# Patient Record
Sex: MALE | Race: WHITE | NOT HISPANIC OR LATINO | Employment: FULL TIME | URBAN - METROPOLITAN AREA
[De-identification: names, ages, dates, MRNs, and addresses within clinical notes are randomized per-mention and may not be internally consistent; named-entity substitution may affect disease eponyms.]

---

## 2017-07-06 ENCOUNTER — HOSPITAL ENCOUNTER (OUTPATIENT)
Facility: MEDICAL CENTER | Age: 52
End: 2017-07-07
Attending: EMERGENCY MEDICINE | Admitting: SURGERY
Payer: OTHER MISCELLANEOUS

## 2017-07-06 ENCOUNTER — APPOINTMENT (OUTPATIENT)
Dept: RADIOLOGY | Facility: MEDICAL CENTER | Age: 52
End: 2017-07-06
Attending: EMERGENCY MEDICINE
Payer: OTHER MISCELLANEOUS

## 2017-07-06 DIAGNOSIS — S41.112A LACERATION OF ARM, LEFT, COMPLICATED, INITIAL ENCOUNTER: ICD-10-CM

## 2017-07-06 PROBLEM — S41.102A OPEN WOUND OF LEFT UPPER ARM: Status: ACTIVE | Noted: 2017-07-06

## 2017-07-06 PROBLEM — S46.902A: Status: ACTIVE | Noted: 2017-07-06

## 2017-07-06 PROBLEM — S41.102A: Status: ACTIVE | Noted: 2017-07-06

## 2017-07-06 LAB
ALBUMIN SERPL BCP-MCNC: 4.2 G/DL (ref 3.2–4.9)
ALBUMIN/GLOB SERPL: 1.2 G/DL
ALP SERPL-CCNC: 71 U/L (ref 30–99)
ALT SERPL-CCNC: 32 U/L (ref 2–50)
ANION GAP SERPL CALC-SCNC: 13 MMOL/L (ref 0–11.9)
AST SERPL-CCNC: 21 U/L (ref 12–45)
BILIRUB SERPL-MCNC: 0.5 MG/DL (ref 0.1–1.5)
BUN SERPL-MCNC: 16 MG/DL (ref 8–22)
CALCIUM SERPL-MCNC: 9.2 MG/DL (ref 8.5–10.5)
CHLORIDE SERPL-SCNC: 103 MMOL/L (ref 96–112)
CO2 SERPL-SCNC: 21 MMOL/L (ref 20–33)
CREAT SERPL-MCNC: 0.95 MG/DL (ref 0.5–1.4)
ERYTHROCYTE [DISTWIDTH] IN BLOOD BY AUTOMATED COUNT: 41.5 FL (ref 35.9–50)
ETHANOL BLD-MCNC: 0 G/DL
GFR SERPL CREATININE-BSD FRML MDRD: >60 ML/MIN/1.73 M 2
GLOBULIN SER CALC-MCNC: 3.5 G/DL (ref 1.9–3.5)
GLUCOSE BLD-MCNC: 165 MG/DL (ref 65–99)
GLUCOSE SERPL-MCNC: 159 MG/DL (ref 65–99)
HCT VFR BLD AUTO: 45.3 % (ref 42–52)
HGB BLD-MCNC: 15.3 G/DL (ref 14–18)
MCH RBC QN AUTO: 31.2 PG (ref 27–33)
MCHC RBC AUTO-ENTMCNC: 33.8 G/DL (ref 33.7–35.3)
MCV RBC AUTO: 92.3 FL (ref 81.4–97.8)
PLATELET # BLD AUTO: 283 K/UL (ref 164–446)
PMV BLD AUTO: 9.1 FL (ref 9–12.9)
POTASSIUM SERPL-SCNC: 3.6 MMOL/L (ref 3.6–5.5)
PROT SERPL-MCNC: 7.7 G/DL (ref 6–8.2)
RBC # BLD AUTO: 4.91 M/UL (ref 4.7–6.1)
SODIUM SERPL-SCNC: 137 MMOL/L (ref 135–145)
WBC # BLD AUTO: 14.7 K/UL (ref 4.8–10.8)

## 2017-07-06 PROCEDURE — A9270 NON-COVERED ITEM OR SERVICE: HCPCS | Performed by: SURGERY

## 2017-07-06 PROCEDURE — 501838 HCHG SUTURE GENERAL: Performed by: SURGERY

## 2017-07-06 PROCEDURE — 80053 COMPREHEN METABOLIC PANEL: CPT

## 2017-07-06 PROCEDURE — 700111 HCHG RX REV CODE 636 W/ 250 OVERRIDE (IP)

## 2017-07-06 PROCEDURE — 500698 HCHG HEMOCLIP, MEDIUM: Performed by: SURGERY

## 2017-07-06 PROCEDURE — A4450 NON-WATERPROOF TAPE: HCPCS | Performed by: SURGERY

## 2017-07-06 PROCEDURE — 160036 HCHG PACU - EA ADDL 30 MINS PHASE I: Performed by: SURGERY

## 2017-07-06 PROCEDURE — 700101 HCHG RX REV CODE 250

## 2017-07-06 PROCEDURE — 700111 HCHG RX REV CODE 636 W/ 250 OVERRIDE (IP): Performed by: NURSE PRACTITIONER

## 2017-07-06 PROCEDURE — 160035 HCHG PACU - 1ST 60 MINS PHASE I: Performed by: SURGERY

## 2017-07-06 PROCEDURE — 700102 HCHG RX REV CODE 250 W/ 637 OVERRIDE(OP): Performed by: SURGERY

## 2017-07-06 PROCEDURE — 160002 HCHG RECOVERY MINUTES (STAT): Performed by: SURGERY

## 2017-07-06 PROCEDURE — 700111 HCHG RX REV CODE 636 W/ 250 OVERRIDE (IP): Performed by: SURGERY

## 2017-07-06 PROCEDURE — A9270 NON-COVERED ITEM OR SERVICE: HCPCS

## 2017-07-06 PROCEDURE — 90471 IMMUNIZATION ADMIN: CPT

## 2017-07-06 PROCEDURE — 36415 COLL VENOUS BLD VENIPUNCTURE: CPT | Performed by: SURGERY

## 2017-07-06 PROCEDURE — 82962 GLUCOSE BLOOD TEST: CPT

## 2017-07-06 PROCEDURE — 500389 HCHG DRAIN, RESERVOIR SUCT JP 100CC: Performed by: SURGERY

## 2017-07-06 PROCEDURE — 80307 DRUG TEST PRSMV CHEM ANLYZR: CPT

## 2017-07-06 PROCEDURE — 96375 TX/PRO/DX INJ NEW DRUG ADDON: CPT

## 2017-07-06 PROCEDURE — 160028 HCHG SURGERY MINUTES - 1ST 30 MINS LEVEL 3: Performed by: SURGERY

## 2017-07-06 PROCEDURE — 73090 X-RAY EXAM OF FOREARM: CPT | Mod: LT

## 2017-07-06 PROCEDURE — G0378 HOSPITAL OBSERVATION PER HR: HCPCS

## 2017-07-06 PROCEDURE — 500440 HCHG DRESSING, STERILE ROLL (KERLIX): Performed by: SURGERY

## 2017-07-06 PROCEDURE — 96365 THER/PROPH/DIAG IV INF INIT: CPT

## 2017-07-06 PROCEDURE — 90715 TDAP VACCINE 7 YRS/> IM: CPT

## 2017-07-06 PROCEDURE — 502240 HCHG MISC OR SUPPLY RC 0272: Performed by: SURGERY

## 2017-07-06 PROCEDURE — G0390 TRAUMA RESPONS W/HOSP CRITI: HCPCS

## 2017-07-06 PROCEDURE — 700102 HCHG RX REV CODE 250 W/ 637 OVERRIDE(OP)

## 2017-07-06 PROCEDURE — 85027 COMPLETE CBC AUTOMATED: CPT

## 2017-07-06 PROCEDURE — 160009 HCHG ANES TIME/MIN: Performed by: SURGERY

## 2017-07-06 PROCEDURE — 160048 HCHG OR STATISTICAL LEVEL 1-5: Performed by: SURGERY

## 2017-07-06 PROCEDURE — 71010 DX-CHEST-LIMITED (1 VIEW): CPT

## 2017-07-06 PROCEDURE — 160039 HCHG SURGERY MINUTES - EA ADDL 1 MIN LEVEL 3: Performed by: SURGERY

## 2017-07-06 PROCEDURE — 99291 CRITICAL CARE FIRST HOUR: CPT

## 2017-07-06 RX ORDER — DOCUSATE SODIUM 100 MG/1
100 CAPSULE, LIQUID FILLED ORAL 2 TIMES DAILY
Status: DISCONTINUED | OUTPATIENT
Start: 2017-07-06 | End: 2017-07-07 | Stop reason: HOSPADM

## 2017-07-06 RX ORDER — POLYETHYLENE GLYCOL 3350 17 G/17G
1 POWDER, FOR SOLUTION ORAL 2 TIMES DAILY
Status: DISCONTINUED | OUTPATIENT
Start: 2017-07-06 | End: 2017-07-07 | Stop reason: HOSPADM

## 2017-07-06 RX ORDER — ENEMA 19; 7 G/133ML; G/133ML
1 ENEMA RECTAL
Status: DISCONTINUED | OUTPATIENT
Start: 2017-07-06 | End: 2017-07-07 | Stop reason: HOSPADM

## 2017-07-06 RX ORDER — CHLORHEXIDINE GLUCONATE ORAL RINSE 1.2 MG/ML
15 SOLUTION DENTAL EVERY 12 HOURS
Status: DISCONTINUED | OUTPATIENT
Start: 2017-07-06 | End: 2017-07-06

## 2017-07-06 RX ORDER — BISACODYL 10 MG
10 SUPPOSITORY, RECTAL RECTAL
Status: DISCONTINUED | OUTPATIENT
Start: 2017-07-06 | End: 2017-07-07 | Stop reason: HOSPADM

## 2017-07-06 RX ORDER — AMOXICILLIN 250 MG
1 CAPSULE ORAL NIGHTLY
Status: DISCONTINUED | OUTPATIENT
Start: 2017-07-06 | End: 2017-07-07 | Stop reason: HOSPADM

## 2017-07-06 RX ORDER — OXYCODONE HYDROCHLORIDE 5 MG/1
5 TABLET ORAL
Status: DISCONTINUED | OUTPATIENT
Start: 2017-07-06 | End: 2017-07-07 | Stop reason: HOSPADM

## 2017-07-06 RX ORDER — OXYCODONE HCL 5 MG/5 ML
SOLUTION, ORAL ORAL
Status: COMPLETED
Start: 2017-07-06 | End: 2017-07-06

## 2017-07-06 RX ORDER — ACETAMINOPHEN 500 MG
1000 TABLET ORAL EVERY 6 HOURS
Status: DISCONTINUED | OUTPATIENT
Start: 2017-07-06 | End: 2017-07-07 | Stop reason: HOSPADM

## 2017-07-06 RX ORDER — OXYCODONE HYDROCHLORIDE 10 MG/1
10 TABLET ORAL
Status: DISCONTINUED | OUTPATIENT
Start: 2017-07-06 | End: 2017-07-07 | Stop reason: HOSPADM

## 2017-07-06 RX ORDER — AMOXICILLIN 250 MG
1 CAPSULE ORAL
Status: DISCONTINUED | OUTPATIENT
Start: 2017-07-06 | End: 2017-07-07 | Stop reason: HOSPADM

## 2017-07-06 RX ORDER — ONDANSETRON 2 MG/ML
4 INJECTION INTRAMUSCULAR; INTRAVENOUS EVERY 4 HOURS PRN
Status: DISCONTINUED | OUTPATIENT
Start: 2017-07-06 | End: 2017-07-07 | Stop reason: HOSPADM

## 2017-07-06 RX ADMIN — FENTANYL CITRATE 25 MCG: 50 INJECTION, SOLUTION INTRAMUSCULAR; INTRAVENOUS at 17:30

## 2017-07-06 RX ADMIN — ACETAMINOPHEN 1000 MG: 500 TABLET ORAL at 23:42

## 2017-07-06 RX ADMIN — METFORMIN HYDROCHLORIDE 500 MG: 500 TABLET ORAL at 17:45

## 2017-07-06 RX ADMIN — CEFAZOLIN SODIUM 2 G: 1 INJECTION, SOLUTION INTRAVENOUS at 15:38

## 2017-07-06 RX ADMIN — CLOSTRIDIUM TETANI TOXOID ANTIGEN (FORMALDEHYDE INACTIVATED), CORYNEBACTERIUM DIPHTHERIAE TOXOID ANTIGEN (FORMALDEHYDE INACTIVATED), BORDETELLA PERTUSSIS TOXOID ANTIGEN (GLUTARALDEHYDE INACTIVATED), BORDETELLA PERTUSSIS FILAMENTOUS HEMAGGLUTININ ANTIGEN (FORMALDEHYDE INACTIVATED), BORDETELLA PERTUSSIS PERTACTIN ANTIGEN, AND BORDETELLA PERTUSSIS FIMBRIAE 2/3 ANTIGEN 0.5 ML: 5; 2; 2.5; 5; 3; 5 INJECTION, SUSPENSION INTRAMUSCULAR at 15:36

## 2017-07-06 RX ADMIN — FENTANYL CITRATE 25 MCG: 50 INJECTION, SOLUTION INTRAMUSCULAR; INTRAVENOUS at 18:10

## 2017-07-06 RX ADMIN — OXYCODONE HYDROCHLORIDE 10 MG: 5 SOLUTION ORAL at 17:50

## 2017-07-06 RX ADMIN — ONDANSETRON 4 MG: 2 INJECTION INTRAMUSCULAR; INTRAVENOUS at 21:59

## 2017-07-06 ASSESSMENT — LIFESTYLE VARIABLES
DO YOU DRINK ALCOHOL: NO
EVER_SMOKED: NEVER

## 2017-07-06 ASSESSMENT — PAIN SCALES - WONG BAKER
WONGBAKER_NUMERICALRESPONSE: HURTS A LITTLE MORE
WONGBAKER_NUMERICALRESPONSE: HURTS A LITTLE MORE

## 2017-07-06 NOTE — IP AVS SNAPSHOT
" <p align=\"LEFT\"><IMG SRC=\"//[email or web address]$/[email or web address]\" alt=\"Image\" WIDTH=\"50%\" HEIGHT=\"200\" BORDER=\"\"></p>                   Name:Jeff Perez  Medical Record Number:[id number]  CSN: [id number]    YOB: 1965   Age: 52 y.o.  Sex: male  HT:1.702 m (5' 7\") WT: 80.74 kg (178 lb)          Admit Date: 7/6/2017     Discharge Date:   Today's Date: 7/7/2017  Attending Doctor:  No att. providers found                  Allergies:  Review of patient's allergies indicates no known allergies.          Follow-up Information     1. Follow up with Abdullahi Sanchez M.D. In 1 week.    Specialty:  Surgery    Contact information    [address] [facility] [address]  [place] [id number] [phone]           Medication List      Take these Medications        Instructions    metformin 500 MG Tabs   Commonly known as:  GLUCOPHAGE    Take 500 mg by mouth every day.   Dose:  500 mg       oxycodone immediate-release 5 MG Tabs   Commonly known as:  ROXICODONE    Take 1 Tab by mouth every 6 hours as needed (Moderate Pain (NRS Pain Scale 4-6; CPOT Pain Scale 3-5)).   Dose:  5 mg         "

## 2017-07-06 NOTE — RESPIRATORY CARE
Respiratory Trauma Red Note    Pt arrived on RA, SpO2 96 BL BS noted, no further interventions needed at this time.

## 2017-07-06 NOTE — IP AVS SNAPSHOT
Make YES[employer] Happen Access Code: [email or web address]  Expires: 8/6/2017  1:35 PM    Make YES! Happen  A secure, online tool to manage your health information     [employer]’s Make YES! Happen® is a secure, online tool that connects you to your personalized health information from the privacy of your home -- day or night - making it very easy for you to manage your healthcare. Once the activation process is completed, you can even access your medical information using the Make YES! Happen samuel, which is available for free in the Apple Samuel store or Google Play store.     Make YES! Happen provides the following levels of access (as shown below):   My Chart Features   [facility] Primary Care Doctor [facility]  Specialists [facility]  Urgent  Care Non-[facility]  Primary Care  Doctor   Email your healthcare team securely and privately 24/7 X X X X   Manage appointments: schedule your next appointment; view details of past/upcoming appointments X      Request prescription refills. X      View recent personal medical records, including lab and immunizations X X X X   View health record, including health history, allergies, medications X X X X   Read reports about your outpatient visits, procedures, consult and ER notes X X X X   See your discharge summary, which is a recap of your hospital and/or ER visit that includes your diagnosis, lab results, and care plan. X X       How to register for Make YES! Happen:  1. Go to  [email or web address].  2. Click on the Sign Up Now box, which takes you to the New Member Sign Up page. You will need to provide the following information:  a. Enter your Make YES! Happen Access Code exactly as it appears at the top of this page. (You will not need to use this code after you’ve completed the sign-up process. If you do not sign up before the expiration date, you must request a new code.)   b. Enter your date of birth.   c. Enter your home email address.   d. Click Submit, and follow the next screen’s instructions.  3. Create a Make YES! Happen ID. This will be your Make YES! Happen  login ID and cannot be changed, so think of one that is secure and easy to remember.  4. Create a [employer] password. You can change your password at any time.  5. Enter your Password Reset Question and Answer. This can be used at a later time if you forget your password.   6. Enter your e-mail address. This allows you to receive e-mail notifications when new information is available in [employer].  7. Click Sign Up. You can now view your health information.    For assistance activating your [employer] account, call [phone]

## 2017-07-06 NOTE — IP AVS SNAPSHOT
7/7/2017    Jeff Perez  [address] [place] [address]    Dear Jeff:    [facility] wants to ensure your discharge home is safe and you or your loved ones have had all of your questions answered regarding your care after you leave the hospital.    Below is a list of resources and contact information should you have any questions regarding your hospital stay, follow-up instructions, or active medical symptoms.    Questions or Concerns Regarding… Contact   Medical Questions Related to Your Discharge  (7 days a week, 8am-5pm) Contact a Nurse Care Coordinator   [phone]   Medical Questions Not Related to Your Discharge  (24 hours a day / 7 days a week)  Contact the Nurse Health Line   [phone]    Medications or Discharge Instructions Refer to your discharge packet   or contact your [facility] Primary Care Provider   [phone]   Follow-up Appointment(s) Schedule your appointment via [employer]   or contact Scheduling [phone]   Billing Review your statement via [employer]  or contact Billing [phone]   Medical Records Review your records via [employer]   or contact Medical Records [phone]     You may receive a telephone call within two days of discharge. This call is to make certain you understand your discharge instructions and have the opportunity to have any questions answered. You can also easily access your medical information, test results and upcoming appointments via the [employer] free online health management tool. You can learn more and sign up at [employer]. For assistance setting up your [employer] account, please call [phone].    Once again, we want to ensure your discharge home is safe and that you have a clear understanding of any next steps in your care. If you have any questions or concerns, please do not hesitate to contact us, we are here for you. Thank you for choosing [facility] for your healthcare needs.    Sincerely,    Your [facility] Healthcare Team

## 2017-07-06 NOTE — H&P
"TRAUMA HISTORY AND PHYSICAL    CHIEF COMPLAINT: Laceration to left arm with questionable loss of pulses    HISTORY OF PRESENT ILLNESS: The patient is a 52-year-old man. The patient was triaged as a Trauma Red in accordance with established pre hospital protocols. An expeditious primary and secondary survey with required adjuncts was conducted. See Trauma Narrator for full details. By reports he had a mirror fall on him which resulted in a laceration to his left forearm. Active bleeding was noted at the scene and a tourniquet was placed. Prior to placing the tourniquet he was noted to have a grossly intact neurovascular exam.    PAST MEDICAL HISTORY:  has a past medical history of Diabetes (CMS-[facility]).     PAST SURGICAL HISTORY:  has no past surgical history on file.     ALLERGIES: NKMA.     CURRENT MEDICATIONS:  None  Home Medications     Reviewed by Perla Jang R.N. (Registered Nurse) on 07/06/17 at 1553  Med List Status: Unable to Obtain    Medication Last Dose Status          Patient Jamison Taking any Medications                        FAMILY HISTORY: family history is not on file.    SOCIAL HISTORY: No smoking    REVIEW OF SYSTEMS: Unable to be elicited secondary to the acuity of the patient's condition.    PHYSICAL EXAMINATION:     CONSTITUTIONAL:     Vital Signs: Blood pressure 121/77, pulse 68, temperature 36.1 °C (97 °F), resp. rate 15, height 1.702 m (5' 7\"), weight 80.74 kg (178 lb), SpO2 99 %.   General Appearance: appears stated age, is in no apparent distress, is well developed and well nourished.  HEENT:     The nares and oropharynx are clear. The midface and jaw are stable. No malocclusion is evident.  NECK:    . The trachea is midline. There is no jugulovenous distention or cervical crepitance.   RESPIRATORY:   Inspection: unlabored respirations, no intercostal retractions or accessory muscle use.   Palpation:  nontender. The clavicles are nondeformed.   Auscultation: " normal.  CARDIOVASCULAR:   Auscultation: normal.   Peripheral Pulses: Normal.   ABDOMEN: Abdomen is soft, nontender, without organomegaly or masses.  GENITOURINARY:   (MALE): normal male external genitalia.  MUSCULOSKELETAL:    The pelvis is stable.    inspection of back is normal.  Extremity: He has a laceration in his distal forearm and the posterior lateral area. There is active bleeding which appears to be venous. Of note, his neurovascular exam distal to this is intact.  SKIN:    Normal.  NEUROLOGIC:    GCS 15. no focal deficits noted, mental status intact.  PSYCHIATRIC:   does not appear depressed or anxious.    LABORATORY VALUES:   Recent Labs      07/06/17   1532   WBC  14.7*   RBC  4.91   HEMOGLOBIN  15.3   HEMATOCRIT  45.3   MCV  92.3   MCH  31.2   MCHC  33.8   RDW  41.5   PLATELETCT  283   MPV  9.1                    IMAGING:   DX-FOREARM LEFT   Final Result      No evidence of fracture or dislocation.      DX-CHEST-LIMITED (1 VIEW)   Final Result      No evidence of acute cardiopulmonary process.          IMPRESSION AND PLAN:     Active Hospital Problems    Diagnosis   • Open wound of left arm with tendon injury [S41.102A, S46.902A]     To OR for exploration 7/6      assessment: 52-year-old man generally in good health now with a laceration to his left distal forearm. He has grossly intact neuro and vascular function. He does have some ongoing active bleeding. Exploration in the OR for achieving hemostasis, washout, and closure is indicated.  Plan: to the OR for the above    DISPOSITION: OR.    CRITICAL CARE TIME: 32 minutes excluding procedures.  ____________________________________   Abdullahi Sanchez M.D.    DD: 7/6/2017  3:57 PM

## 2017-07-06 NOTE — ED PROVIDER NOTES
ED Provider Note    Scribed for Emir Holloway D.O. by Tawana Garza. 7/6/2017  3:29 PM    Primary care provider: No primary care provider on file.  Means of arrival: EMS  History obtained from: Patient and EMS  History limited by: None    CHIEF COMPLAINT  Chief Complaint   Patient presents with   • Trauma Red       HPI  Limit Seven is a 52-year-old male who presents to the Emergency Department as a trauma red for evaluation of a left forearm laceration onset prior to his arrival. Per EMS, the patient is a  and received a laceration when he was installing a mirror that broke and fell onto his arm. EMS reports reports that the patient had all pulses intact prior to Tourniquet placement. The patient denies any pain at this time. The patient's tetanus is up to date.    REVIEW OF SYSTEMS  Pertinent positives include wrist laceration. Pertinent negatives include no loss of sensation or strength in affected area. All other review systems are negative  C.    PAST MEDICAL HISTORY  Past Medical History   Diagnosis Date   • Diabetes (CMS-HCC)        SURGICAL HISTORY  No surgical history noted.    SOCIAL HISTORY  Patient works as a .    FAMILY HISTORY  No family history noted    CURRENT MEDICATIONS  No current facility-administered medications on file prior to encounter.     No current outpatient prescriptions on file prior to encounter.     ALLERGIES  No Known Allergies    PHYSICAL EXAM  VITAL SIGNS: /77 mmHg  Pulse 68  Resp 15  SpO2 99%  Nursing notes and vitals reviewed.  Constitutional: Well developed, Well nourished, Non-toxic appearance.   Eyes: PERRLA, EOMI, Conjunctiva normal, No discharge.   HENT: Symmetric, atraumatic, no scalp laceration, no facial bony deformity or tenderness, no hemotympanum, no dental trauma, normal oropharynx.    Neck: No cervical spine tenderness, no step off deformity, patient is in a c-spine immobilization collar.    Cardiovascular: Normal heart rate, Normal rhythm, No murmurs, No rubs, No gallops, Normal heart tones.   Thorax & Lungs: No respiratory distress, Breath sounds equal bilaterally with equal chest expansion upon inspiration, No subcutaneous air, No flail segment, No rales, No rhonchi, No wheezing, No chest tenderness.   Abdomen: Bowel sounds normal, Soft, No tenderness, No guarding, No rebound, No pulsatile masses.   Skin: Warm, 12 cm stellate laceration the left volar forearm, slight active bleeding, superficial.   Musculoskeletal: Intact distal pulses lacerations described as above, ulnar and radial pulses are brisk, no elbow tenderness  Extremities: No long bone tenderness or deformity, distal pulses are brisk, pelvis is stable.   Neurologic: Alert & oriented x 3, GCS 15, ulnar, median and radial nerve intact sensation strength left upper extremity.  Psychiatric: Affect normal for clinical presentation.    DIAGNOSTIC STUDIES/PROCEDURES    LABS  Results for orders placed or performed during the hospital encounter of 07/06/17   DIAGNOSTIC ALCOHOL   Result Value Ref Range    Diagnostic Alcohol 0.00 0.00 g/dL   CBC WITHOUT DIFFERENTIAL   Result Value Ref Range    WBC 14.7 (H) 4.8 - 10.8 K/uL    RBC 4.91 4.70 - 6.10 M/uL    Hemoglobin 15.3 14.0 - 18.0 g/dL    Hematocrit 45.3 42.0 - 52.0 %    MCV 92.3 81.4 - 97.8 fL    MCH 31.2 27.0 - 33.0 pg    MCHC 33.8 33.7 - 35.3 g/dL    RDW 41.5 35.9 - 50.0 fL    Platelet Count 283 164 - 446 K/uL    MPV 9.1 9.0 - 12.9 fL   COMP METABOLIC PANEL   Result Value Ref Range    Sodium 137 135 - 145 mmol/L    Potassium 3.6 3.6 - 5.5 mmol/L    Chloride 103 96 - 112 mmol/L    Co2 21 20 - 33 mmol/L    Anion Gap 13.0 (H) 0.0 - 11.9    Glucose 159 (H) 65 - 99 mg/dL    Bun 16 8 - 22 mg/dL    Creatinine 0.95 0.50 - 1.40 mg/dL    Calcium 9.2 8.5 - 10.5 mg/dL    AST(SGOT) 21 12 - 45 U/L    ALT(SGPT) 32 2 - 50 U/L    Alkaline Phosphatase 71 30 - 99 U/L    Total Bilirubin 0.5 0.1 - 1.5 mg/dL     Albumin 4.2 3.2 - 4.9 g/dL    Total Protein 7.7 6.0 - 8.2 g/dL    Globulin 3.5 1.9 - 3.5 g/dL    A-G Ratio 1.2 g/dL   ESTIMATED GFR   Result Value Ref Range    GFR If African American >60 >60 mL/min/1.73 m 2    GFR If Non African American >60 >60 mL/min/1.73 m 2   ACCU-CHEK GLUCOSE   Result Value Ref Range    Glucose - Accu-Ck 165 (H) 65 - 99 mg/dL     All labs reviewed by me.    RADIOLOGY  DX-FOREARM LEFT   Final Result      No evidence of fracture or dislocation.      DX-CHEST-LIMITED (1 VIEW)   Final Result      No evidence of acute cardiopulmonary process.        The radiologist's interpretation of all radiological studies have been reviewed by me.    COURSE & MEDICAL DECISION MAKING  Pertinent Labs & Imaging studies reviewed. (See chart for details)    3:29 PM - I was called acutely to examine the patient in the trauma bay. Patient will be treated with IVPB Ancef and 0.5 ml Adacel injection. Ordered left forearm x-ray, chest x-ray, Diagnostic alcohol, CBC without differential, CMP, estimated GFR to evaluate his symptoms.   This is a [facility] 52-year-old male presents with laceration to the left forearm. The patient is neurovascularly intact. The patient received Ancef 2 g IV, tetanus booster and will be going to the operating room with Dr. Sanchez. He has no focal neurological vascular deficits upon admission to surgery. 3:32 PM Dr. Sanchez, Trauma surgery, arrived in the trauma bay.    3:33 PM Ordered a chest and forearm x-ray.    3:36 PM The patient will be admitted to the OR.    DISPOSITION:  Patient will be admitted to OR in guarded condition.    FINAL IMPRESSION  1. Laceration of arm, left, complicated, initial encounter          Tawana WILLINGHAM (Ryan), am scribing for, and in the presence of, Emir Hollowya D.O    Electronically signed by: Tawana Garza (Ryan), 7/6/2017    Emir WILLINGHAM D.O. personally performed the services described in this documentation, as scribed  by Tawana Garza in my presence, and it is both accurate and complete.    The note accurately reflects work and decisions made by me.  Emir Holloway  7/6/2017  6:33 PM

## 2017-07-06 NOTE — IP AVS SNAPSHOT
" Home Care Instructions                                                                                                                  Name:Jeff Perez  Medical Record Number:[id number]  CSN: [id number]    YOB: 1965   Age: 52 y.o.  Sex: male  HT:1.702 m (5' 7\") WT: 80.74 kg (178 lb)          Admit Date: 7/6/2017     Discharge Date:   Today's Date: 7/7/2017  Attending Doctor:  No att. providers found                  Allergies:  Review of patient's allergies indicates no known allergies.            Discharge Instructions         MEWS - Patient has a score 2 or less   Jeff Perez MR#[id number] (CSN:[id number])  (52 y.o. M)  (Adm: 07/06/17 1528)     [id number]         Attending Provider: (none) Comment          Last edited by  on  at      Allergies: No Known Allergies    Isolation: None   MDRO: None   Code Status: FULL    HT: 1.702 m (5' 7\")   WT: 80.74 kg (178 lb)   Admission Wt: 80.74 kg (178 lb)    Free Text DX: Open wound of left upper arm   Coded DX: None    BMI: 27.87 kg/m 2   BSA: 1.95 m 2             Admission Medication Reconciliation Status      Reviewed by Codi Garcia, Pharmacy Int on 07/06/17 at 1608  Med List Status: Complete              Held Orders **Orders signed and held for discharge/readmit must only be released on the new encounter!  1     None           Clinician Collect      None       Reviewed Transfer Orders      None       Meds to be Acknowledged           None       Orders to be Acknowledged           New Nursing Orders  Acknowledge Section      Ordered       Ordering Provider        07/07/17 1249  DISCHARGE NURSING COMMUNICATION Start: 07/07/17 1249, CONTINUOUS, ROUTINE     Comments: - Call or seek medical attention for questions or concerns   - Follow up with Dr. Sanchez in one weeks time   - Follow up with primary care provider within one weeks time   - Resume regular diet   - Continue daily over the counter stool softener while on narcotics   - No " operation of machinery or motorized vehicles while under the influence of narcotics   - No alcohol use while under the influence of narcotics   - No swimming, hot tubs, baths or wound submersion until cleared by outpatient provider. May shower   - No contact sports, strenuous activities, or heavy lifting until cleared by outpatient provider   - If respiratory decompensation, swelling of your arm, worsening pain of your arm, fever, or signs or symptoms of infection occur seek medical attention   Additional Information Start Time Order ID Status   Transfer Service: --    Verbal Mode: --    Patient Class: --    Ordering User: KENTON Albert    Process Instructions: --     07/07/17 1249 [id number] Sent       KENTON Albert Acknowledge New     07/07/17 1231  REMOVE DRAINS/TUBES Start: 07/07/17 1232, End: 07/07/17 1232, ONCE, ROUTINE     Additional Information Start Time Order ID Status   Transfer Service: --    Verbal Mode: --    Patient Class: --    Ordering User: KENTON Albert    Process Instructions: --     07/07/17 1232 [id number] Sent      Question: Specify Site: Answer: forearm YUNG    KENTON Albert Acknowledge New              Outpatient Meds to be Acknowledged           None         Discharge Instructions    Discharged to home by car with relative. Discharged via wheelchair, hospital escort: Yes.  Special equipment needed: Not Applicable    Be sure to schedule a follow-up appointment with your primary care doctor or any specialists as instructed.     Discharge Plan:   Influenza Vaccine Indication: Patient Refuses    I understand that a diet low in cholesterol, fat, and sodium is recommended for good health. Unless I have been given specific instructions below for another diet, I accept this instruction as my diet prescription.   Other diet: regular    Special Instructions: None    · Is patient discharged on Warfarin / Coumadin?   No     · Is patient Post Blood  Transfusion?  No  Do not get constipated you may take colace a stool softener or milk of magnesia a laxative. Keep incision clean and dry, you may shower in the am and get incision wet and place a dry dressing. Call the MD for a fever over 101. Pain not relieved by your pain medication, if the wound starts to bleed and not stop, if the wound gets red and drains pus or any other problems.     Depression / Suicide Risk    As you are discharged from this [facility] facility, it is important to learn how to keep safe from harming yourself.    Recognize the warning signs:  · Abrupt changes in personality, positive or negative- including increase in energy   · Giving away possessions  · Change in eating patterns- significant weight changes-  positive or negative  · Change in sleeping patterns- unable to sleep or sleeping all the time   · Unwillingness or inability to communicate  · Depression  · Unusual sadness, discouragement and loneliness  · Talk of wanting to die  · Neglect of personal appearance   · Rebelliousness- reckless behavior  · Withdrawal from people/activities they love  · Confusion- inability to concentrate     If you or a loved one observes any of these behaviors or has concerns about self-harm, here's what you can do:  · Talk about it- your feelings and reasons for harming yourself  · Remove any means that you might use to hurt yourself (examples: pills, rope, extension cords, firearm)  · Get professional help from the community (Mental Health, Substance Abuse, psychological counseling)  · Do not be alone:Call your Safe Contact- someone whom you trust who will be there for you.  · Call your local CRISIS HOTLINE 101-0404 or 543-616-9331  · Call your local Children's Mobile Crisis Response Team [place] (035) 312-7532 or www.[employer]  · Call the toll free National Suicide Prevention Hotlines   · National Suicide Prevention Lifeline 871-490-HOQV (3656)  · National [employer] Line Network  800-SUICIDE (518-9337)    Resume other outpatient medications.     Follow-up Information     1. Follow up with Abdullahi Sanchez M.D. In 1 week.    Specialty:  Surgery    Contact information    [address] [facility] [address]  [place] [id number] [phone]           Discharge Medication Instructions:    Below are the medications your physician expects you to take upon discharge:    Review all your home medications and newly ordered medications with your doctor and/or pharmacist. Follow medication instructions as directed by your doctor and/or pharmacist.    Please keep your medication list with you and share with your physician.               Medication List      START taking these medications        Instructions    Morning Afternoon Evening Bedtime    oxycodone immediate-release 5 MG Tabs   Last time this was given:  5 mg on 7/7/2017  9:23 AM   Commonly known as:  ROXICODONE   Next Dose Due:  3:30        Take 1 Tab by mouth every 6 hours as needed (Moderate Pain (NRS Pain Scale 4-6; CPOT Pain Scale 3-5)).   Dose:  5 mg                          CONTINUE taking these medications        Instructions    Morning Afternoon Evening Bedtime    metformin 500 MG Tabs   Last time this was given:  500 mg on 7/6/2017  5:45 PM   Commonly known as:  GLUCOPHAGE   Next Dose Due:  today        Take 500 mg by mouth every day.   Dose:  500 mg                             Where to Get Your Medications      Information about where to get these medications is not yet available     ! Ask your nurse or doctor about these medications    - oxycodone immediate-release 5 MG Tabs            Instructions           Diet / Nutrition:    Follow any diet instructions given to you by your doctor or the dietician, including how much salt (sodium) you are allowed each day.    If you are overweight, talk to your doctor about a weight reduction plan.    Activity:    Remain physically active following your doctor's instructions about exercise and  activity.    Rest often.     Any time you become even a little tired or short of breath, SIT DOWN and rest.    Worsening Symptoms:    Report any of the following signs and symptoms to the doctor's office immediately:    *Pain of jaw, arm, or neck  *Chest pain not relieved by medication                               *Dizziness or loss of consciousness  *Difficulty breathing even when at rest   *More tired than usual                                       *Bleeding drainage or swelling of surgical site  *Swelling of feet, ankles, legs or stomach                 *Fever (>100ºF)  *Pink or blood tinged sputum  *Weight gain (3lbs/day or 5lbs /week)           *Shock from internal defibrillator (if applicable)  *Palpitations or irregular heartbeats                *Cool and/or numb extremities    Stroke Awareness    Common Risk Factors for Stroke include:    Age  Atrial Fibrillation  Carotid Artery Stenosis  Diabetes Mellitus  Excessive alcohol consumption  High blood pressure  Overweight   Physical inactivity  Smoking    Warning signs and symptoms of a stroke include:    *Sudden numbness or weakness of the face, arm or leg (especially on one side of the body).  *Sudden confusion, trouble speaking or understanding.  *Sudden trouble seeing in one or both eyes.  *Sudden trouble walking, dizziness, loss of balance or coordination.Sudden severe headache with no known cause.    It is very important to get treatment quickly when a stroke occurs. If you experience any of the above warning signs, call 911 immediately.                   Disclaimer         Quit Smoking / Tobacco Use:    I understand the use of any tobacco products increases my chance of suffering from future heart disease or stroke and could cause other illnesses which may shorten my life. Quitting the use of tobacco products is the single most important thing I can do to improve my health. For further information on smoking / tobacco cessation call a Toll Free Quit  Line at 1-408.128.4458 (*[employer]) or 1-861.863.6848 (American Lung Association) or you can access the web based program at www.lungusa.org.    [place] Tobacco Users Help Line:  (864) 762-7791       Toll Free: 1-709.206.3126  Quit Tobacco Program [facility] (146)482-6336    Crisis Hotline:    [place] Crisis Hotline:  6-009-MTMURJR or 1-494.719.4792    [place] Crisis Hotline:    1-106.815.1312 or 439-836-2401    Discharge Survey:   Thank you for choosing [facility]. We hope we did everything we could to make your hospital stay a pleasant one. You may be receiving a phone survey and we would appreciate your time and participation in answering the questions. Your input is very valuable to us in our efforts to improve our service to our patients and their families.        My signature on this form indicates that:    1. I have reviewed and understand the above information.  2. My questions regarding this information have been answered to my satisfaction.  3. I have formulated a plan with my discharge nurse to obtain my prescribed medications for home.                  Disclaimer         __________________________________                     __________       ________                       Patient Signature                                                 Date                    Time

## 2017-07-07 VITALS
BODY MASS INDEX: 27.94 KG/M2 | RESPIRATION RATE: 18 BRPM | SYSTOLIC BLOOD PRESSURE: 111 MMHG | HEART RATE: 68 BPM | OXYGEN SATURATION: 95 % | HEIGHT: 67 IN | TEMPERATURE: 97.9 F | WEIGHT: 178 LBS | DIASTOLIC BLOOD PRESSURE: 69 MMHG

## 2017-07-07 LAB
BASOPHILS # BLD AUTO: 0.2 % (ref 0–1.8)
BASOPHILS # BLD: 0.04 K/UL (ref 0–0.12)
EOSINOPHIL # BLD AUTO: 0.02 K/UL (ref 0–0.51)
EOSINOPHIL NFR BLD: 0.1 % (ref 0–6.9)
ERYTHROCYTE [DISTWIDTH] IN BLOOD BY AUTOMATED COUNT: 41.6 FL (ref 35.9–50)
GLUCOSE BLD-MCNC: 139 MG/DL (ref 65–99)
GLUCOSE BLD-MCNC: 177 MG/DL (ref 65–99)
GLUCOSE BLD-MCNC: 223 MG/DL (ref 65–99)
HCT VFR BLD AUTO: 39.2 % (ref 42–52)
HGB BLD-MCNC: 13.3 G/DL (ref 14–18)
IMM GRANULOCYTES # BLD AUTO: 0.08 K/UL (ref 0–0.11)
IMM GRANULOCYTES NFR BLD AUTO: 0.5 % (ref 0–0.9)
LYMPHOCYTES # BLD AUTO: 3.54 K/UL (ref 1–4.8)
LYMPHOCYTES NFR BLD: 21.3 % (ref 22–41)
MCH RBC QN AUTO: 31.1 PG (ref 27–33)
MCHC RBC AUTO-ENTMCNC: 33.9 G/DL (ref 33.7–35.3)
MCV RBC AUTO: 91.6 FL (ref 81.4–97.8)
MONOCYTES # BLD AUTO: 1 K/UL (ref 0–0.85)
MONOCYTES NFR BLD AUTO: 6 % (ref 0–13.4)
NEUTROPHILS # BLD AUTO: 11.93 K/UL (ref 1.82–7.42)
NEUTROPHILS NFR BLD: 71.9 % (ref 44–72)
NRBC # BLD AUTO: 0 K/UL
NRBC BLD AUTO-RTO: 0 /100 WBC
PLATELET # BLD AUTO: 247 K/UL (ref 164–446)
PMV BLD AUTO: 9 FL (ref 9–12.9)
RBC # BLD AUTO: 4.28 M/UL (ref 4.7–6.1)
WBC # BLD AUTO: 16.6 K/UL (ref 4.8–10.8)

## 2017-07-07 PROCEDURE — A9270 NON-COVERED ITEM OR SERVICE: HCPCS | Performed by: SURGERY

## 2017-07-07 PROCEDURE — 36415 COLL VENOUS BLD VENIPUNCTURE: CPT

## 2017-07-07 PROCEDURE — G0378 HOSPITAL OBSERVATION PER HR: HCPCS

## 2017-07-07 PROCEDURE — 700102 HCHG RX REV CODE 250 W/ 637 OVERRIDE(OP): Performed by: SURGERY

## 2017-07-07 PROCEDURE — 700112 HCHG RX REV CODE 229: Performed by: SURGERY

## 2017-07-07 PROCEDURE — 85025 COMPLETE CBC W/AUTO DIFF WBC: CPT

## 2017-07-07 PROCEDURE — 82962 GLUCOSE BLOOD TEST: CPT

## 2017-07-07 RX ORDER — OXYCODONE HYDROCHLORIDE 5 MG/1
5 TABLET ORAL EVERY 6 HOURS PRN
Qty: 15 TAB | Refills: 0 | Status: SHIPPED | OUTPATIENT
Start: 2017-07-07

## 2017-07-07 RX ADMIN — OXYCODONE HYDROCHLORIDE 5 MG: 10 TABLET ORAL at 09:23

## 2017-07-07 RX ADMIN — ACETAMINOPHEN 1000 MG: 500 TABLET ORAL at 06:35

## 2017-07-07 RX ADMIN — MAGNESIUM HYDROXIDE 30 ML: 400 SUSPENSION ORAL at 08:05

## 2017-07-07 RX ADMIN — DOCUSATE SODIUM 100 MG: 100 CAPSULE ORAL at 08:06

## 2017-07-07 RX ADMIN — ACETAMINOPHEN 1000 MG: 500 TABLET ORAL at 11:53

## 2017-07-07 RX ADMIN — POLYETHYLENE GLYCOL 3350 1 PACKET: 17 POWDER, FOR SOLUTION ORAL at 08:06

## 2017-07-07 ASSESSMENT — COPD QUESTIONNAIRES
COPD SCREENING SCORE: 0
HAVE YOU SMOKED AT LEAST 100 CIGARETTES IN YOUR ENTIRE LIFE: NO/DON'T KNOW
DO YOU EVER COUGH UP ANY MUCUS OR PHLEGM?: NO/ONLY WITH OCCASIONAL COLDS OR INFECTIONS
DURING THE PAST 4 WEEKS HOW MUCH DID YOU FEEL SHORT OF BREATH: NONE/LITTLE OF THE TIME

## 2017-07-07 ASSESSMENT — ENCOUNTER SYMPTOMS
NEUROLOGICAL NEGATIVE: 1
BACK PAIN: 0
DIZZINESS: 0
MYALGIAS: 1
NECK PAIN: 0
FEVER: 0
SENSORY CHANGE: 0
ABDOMINAL PAIN: 0
NAUSEA: 0
VOMITING: 0
GASTROINTESTINAL NEGATIVE: 1
EYES NEGATIVE: 1
SHORTNESS OF BREATH: 0
RESPIRATORY NEGATIVE: 1
HEADACHES: 0
CONSTITUTIONAL NEGATIVE: 1
PSYCHIATRIC NEGATIVE: 1
CARDIOVASCULAR NEGATIVE: 1
FOCAL WEAKNESS: 0

## 2017-07-07 ASSESSMENT — PAIN SCALES - GENERAL
PAINLEVEL_OUTOF10: 4
PAINLEVEL_OUTOF10: 9

## 2017-07-07 ASSESSMENT — LIFESTYLE VARIABLES: EVER_SMOKED: NEVER

## 2017-07-07 NOTE — OR NURSING
Received report from Shruthi Hooper. Awaiting change of shift for transport. 1915 pt placed on transport to t 428-01.

## 2017-07-07 NOTE — PROGRESS NOTES
"  Trauma/Surgical Progress Note    Author: Claudia Solis Date & Time created: 7/7/2017   12:49 PM     Interval Events:  Admitted last night - forearm laceration s/p exploration and repair.  Tertiary survey completed without further findings.  RAP and SBIRT completed.  Will discharge home with family today.    Review of Systems   Constitutional: Negative.  Negative for fever and malaise/fatigue.   HENT: Negative.    Eyes: Negative.    Respiratory: Negative.  Negative for shortness of breath.    Cardiovascular: Negative.    Gastrointestinal: Negative.  Negative for nausea, vomiting and abdominal pain.   Genitourinary: Negative.         Voiding    Musculoskeletal: Positive for myalgias. Negative for back pain, joint pain and neck pain.        Left forearm   Skin: Negative.    Neurological: Negative.  Negative for dizziness, sensory change, focal weakness and headaches.   Psychiatric/Behavioral: Negative.      Hemodynamics:  Blood pressure 111/69, pulse 68, temperature 36.6 °C (97.9 °F), resp. rate 18, height 1.702 m (5' 7\"), weight 80.74 kg (178 lb), SpO2 95 %.     Respiratory:    Respiration: 18, Pulse Oximetry: 95 %           Fluids:    Intake/Output Summary (Last 24 hours) at 07/07/17 1249  Last data filed at 07/07/17 1155   Gross per 24 hour   Intake   2520 ml   Output   1208 ml   Net   1312 ml     Admit Weight: 80.74 kg (178 lb)  Current Weight: 80.74 kg (178 lb)    Physical Exam   Constitutional: He is oriented to person, place, and time. He appears well-developed and well-nourished. No distress.   HENT:   Head: Normocephalic and atraumatic.   Eyes: Conjunctivae are normal.   Neck: Neck supple.   Cardiovascular: Normal rate, regular rhythm and intact distal pulses.    Pulmonary/Chest: Effort normal and breath sounds normal. No respiratory distress.   Abdominal: Soft. Bowel sounds are normal. He exhibits no distension.   Musculoskeletal: Normal range of motion. He exhibits tenderness. He exhibits no edema.   Left " forearm  YUNG drain - scant output - remove   Neurological: He is alert and oriented to person, place, and time.   Skin: Skin is warm and dry.   Psychiatric: He has a normal mood and affect. His behavior is normal.   Nursing note and vitals reviewed.      Medical Decision Making/Problem List:    Active Hospital Problems    Diagnosis   • Open wound of left arm with tendon injury [S41.102A, S46.902A]     To OR for exploration 7/6     • Open wound of left upper arm [S41.102A]     Core Measures & Quality Metrics:  Labs reviewed, Medications reviewed and Radiology images reviewed  [place] catheter: No [place]      DVT Prophylaxis: Not indicated at this time, ambulatory  DVT prophylaxis - mechanical: Not indicated at this time, ambulatory  Ulcer prophylaxis: Not indicated    Assessed for rehab: Patient returned to prior level of function, rehabilitation not indicated at this time    Total Score: 2  ETOH Screening     Intervention complete date: 7/7/2017  Patient response to intervention: denies alcohol, tobacco or illicit drug use .   Patient demonstrats understanding of intervention.Plan of care: no need for intervention     has not been contacted.      Discussed patient condition with Family, RN, Patient and trauma surgery Dr Sanchez    Seen on rounds  Ok to discharge  Discussed with Claudia Sanchez MD

## 2017-07-07 NOTE — DISCHARGE INSTRUCTIONS
"  MEWS - Patient has a score 2 or less   Jeff Perez MR#[id number] (CSN:[id number])  (52 y.o. M)  (Adm: 07/06/17 6851)     [id number]         Attending Provider: (none) Comment          Last edited by  on  at      Allergies: No Known Allergies    Isolation: None   MDRO: None   Code Status: FULL    HT: 1.702 m (5' 7\")   WT: 80.74 kg (178 lb)   Admission Wt: 80.74 kg (178 lb)    Free Text DX: Open wound of left upper arm   Coded DX: None    BMI: 27.87 kg/m 2   BSA: 1.95 m 2             Admission Medication Reconciliation Status      Reviewed by Codi Garcia, Pharmacy Int on 07/06/17 at 1608  Med List Status: Complete              Held Orders **Orders signed and held for discharge/readmit must only be released on the new encounter!  1     None           Clinician Collect      None       Reviewed Transfer Orders      None       Meds to be Acknowledged           None       Orders to be Acknowledged           New Nursing Orders  Acknowledge Section      Ordered       Ordering Provider        07/07/17 1249  DISCHARGE NURSING COMMUNICATION Start: 07/07/17 1249, CONTINUOUS, ROUTINE     Comments: - Call or seek medical attention for questions or concerns   - Follow up with Dr. Sanchez in one weeks time   - Follow up with primary care provider within one weeks time   - Resume regular diet   - Continue daily over the counter stool softener while on narcotics   - No operation of machinery or motorized vehicles while under the influence of narcotics   - No alcohol use while under the influence of narcotics   - No swimming, hot tubs, baths or wound submersion until cleared by outpatient provider. May shower   - No contact sports, strenuous activities, or heavy lifting until cleared by outpatient provider   - If respiratory decompensation, swelling of your arm, worsening pain of your arm, fever, or signs or symptoms of infection occur seek medical attention   Additional Information Start Time Order ID Status "   Transfer Service: --    Verbal Mode: --    Patient Class: --    Ordering User: KENTON Albert    Process Instructions: --     07/07/17 1249 [id number] Sent       KENTON Albert Acknowledge New     07/07/17 1231  REMOVE DRAINS/TUBES Start: 07/07/17 1232, End: 07/07/17 1232, ONCE, ROUTINE     Additional Information Start Time Order ID Status   Transfer Service: --    Verbal Mode: --    Patient Class: --    Ordering User: KENTON Albert    Process Instructions: --     07/07/17 1232 [id number] Sent      Question: Specify Site: Answer: forearm YUNG    KENTON Albert Acknowledge New              Outpatient Meds to be Acknowledged           None         Discharge Instructions    Discharged to home by car with relative. Discharged via wheelchair, hospital escort: Yes.  Special equipment needed: Not Applicable    Be sure to schedule a follow-up appointment with your primary care doctor or any specialists as instructed.     Discharge Plan:   Influenza Vaccine Indication: Patient Refuses    I understand that a diet low in cholesterol, fat, and sodium is recommended for good health. Unless I have been given specific instructions below for another diet, I accept this instruction as my diet prescription.   Other diet: regular    Special Instructions: None    · Is patient discharged on Warfarin / Coumadin?   No     · Is patient Post Blood Transfusion?  No  Do not get constipated you may take colace a stool softener or milk of magnesia a laxative. Keep incision clean and dry, you may shower in the am and get incision wet and place a dry dressing. Call the MD for a fever over 101. Pain not relieved by your pain medication, if the wound starts to bleed and not stop, if the wound gets red and drains pus or any other problems.     Depression / Suicide Risk    As you are discharged from this [facility] facility, it is important to learn how to keep safe from harming yourself.    Recognize the  warning signs:  · Abrupt changes in personality, positive or negative- including increase in energy   · Giving away possessions  · Change in eating patterns- significant weight changes-  positive or negative  · Change in sleeping patterns- unable to sleep or sleeping all the time   · Unwillingness or inability to communicate  · Depression  · Unusual sadness, discouragement and loneliness  · Talk of wanting to die  · Neglect of personal appearance   · Rebelliousness- reckless behavior  · Withdrawal from people/activities they love  · Confusion- inability to concentrate     If you or a loved one observes any of these behaviors or has concerns about self-harm, here's what you can do:  · Talk about it- your feelings and reasons for harming yourself  · Remove any means that you might use to hurt yourself (examples: pills, rope, extension cords, firearm)  · Get professional help from the community (Mental Health, Substance Abuse, psychological counseling)  · Do not be alone:Call your Safe Contact- someone whom you trust who will be there for you.  · Call your local CRISIS HOTLINE 629-3666 or 668-510-8111  · Call your local Children's Mobile Crisis Response Team [place] (240) 921-1800 or www.[employer]  · Call the toll free National Suicide Prevention Hotlines   · National Suicide Prevention Lifeline 524-495-SIZL (5830)  · National Hope Line Network 800-SUICIDE (246-7162)    Resume other outpatient medications.

## 2017-07-07 NOTE — OP REPORT
Surgeon:Abdullahi Sanchez  Preoperative diagnosis: Laceration of left forearm  Postoperative diagnosis: Complex laceration of left forearm  Procedure: Wound exploration of left forearm with oversew of small bleeding vessels, washout, debridement, and layered closure of 12 cm complex laceration.  Anesthesia: Gen. endotracheal anesthesia  Anesthesiologist: Froylan Jacobson M.D.  Indications: 52-year-old man who sustained a complex laceration secondary to being struck by a mirror. Active bleeding and a complex laceration were noted in the emergency room. Based on the complexity of the laceration I felt that repair in the operating room was indicated.  Narrative: The procedure was discussed in detail with the patient including the risk of bleeding, infection, abscess, and hematoma. He understood all of the above and wish to proceed. He was placed under anesthesia by Dr. Jacobson. His left arm was prepped with Betadine prep and draped sterilely. Exploration of the wound did reveal active bleeding. This was controlled with suture ligation and electrocautery. Some devitalized tissue was then debrided. The wound was then irrigated copiously. A 7 flat channel drain was placed in the wound. The wound was then closed in layers with 3-0 Vicryl and 2-0 nylon sutures. 4 x 4 dressings covered with a Kerlix gauze were then placed. The patient tolerated the procedure well without apparent complication. The final counts were reported as correct.    Abdullahi Sanchez MD

## 2017-07-07 NOTE — PROGRESS NOTES
Pt AOx4 admitted to room 428-1 via transport in [facility] from PACU at 1930.  Pt complaining of left arm pain, reported at 7 on a scale of 0-10. Oriented to room call light and smoking policy.  Reviewed plan of care (equipment, incentive spirometer, sequential compression devices, medications, activity, diet, fall precautions, skin care, and pain) with patient and family.

## 2017-07-07 NOTE — PROGRESS NOTES
YUNG drain removed and dry gauze dressing applied. Pt instructed he may get the arm wet in the shower in the am and replace dressing with dry dressing as needed. Discharge instructions gone over with pt.

## 2017-07-07 NOTE — PROGRESS NOTES
AOx4, ambulatory with sba, + N/V, -flatus, hypoactive bs, voiding, -BM, reporting L arm pain that is 5/10, medicated per MAR. Pulling 1500ml on IS, satting in mid 90's on 1L NC when asleep. Call light in place, patient educated on importance of calling for assistance, VSS

## 2017-09-27 NOTE — ADDENDUM NOTE
Encounter addended by: Cristobal Jackson on: 9/27/2017 12:59 PM<BR>    Actions taken: Sign clinical note

## 2017-09-27 NOTE — ADDENDUM NOTE
Encounter addended by: Cristobal Jackson on: 9/27/2017 12:08 PM<BR>    Actions taken: Pend clinical note

## 2017-09-27 NOTE — DOCUMENTATION QUERY
DOCUMENTATION QUERY    PROVIDERS: Please select “Cosign w/ note”to reply to query.    To better represent the severity of illness of your patient, please review the following information and exercise your independent professional judgment in responding to this query.     This patient presented with a laceration of the forearm after a mirror fell on him.Tendon injury is documented in the Progress Notes. The Operative report documents complex laceration and neither tendon injury or tendon repair is mentioned in the Operative Report. Based upon the clinical findings, risk factors, and treatment, can this diagnosis be further specified in the op report? Which of the following applies?      Tendon injury ruled in  Tendon injury ruled out  Other explanation of clinical findings (please document)      The medical record reflects the following:   Clinical Findings  Tendon injury mentioned, but not in op. report   Treatment  Complex laceration repair   Risk Factors    Location within medical record  Progress Notes and operative report     Thank you,   Noel Jackson      No tendon injury

## 2021-02-17 ENCOUNTER — HOSPITAL ENCOUNTER (EMERGENCY)
Facility: MEDICAL CENTER | Age: 56
End: 2021-02-17
Attending: EMERGENCY MEDICINE
Payer: COMMERCIAL

## 2021-02-17 VITALS
RESPIRATION RATE: 18 BRPM | OXYGEN SATURATION: 95 % | WEIGHT: 161.6 LBS | SYSTOLIC BLOOD PRESSURE: 120 MMHG | TEMPERATURE: 96.7 F | HEART RATE: 62 BPM | DIASTOLIC BLOOD PRESSURE: 85 MMHG | HEIGHT: 66 IN | BODY MASS INDEX: 25.97 KG/M2

## 2021-02-17 DIAGNOSIS — S29.011A PECTORALIS MUSCLE STRAIN, INITIAL ENCOUNTER: ICD-10-CM

## 2021-02-17 DIAGNOSIS — M67.911 ROTATOR CUFF DISORDER, RIGHT: ICD-10-CM

## 2021-02-17 LAB — EKG IMPRESSION: NORMAL

## 2021-02-17 PROCEDURE — 700111 HCHG RX REV CODE 636 W/ 250 OVERRIDE (IP): Performed by: EMERGENCY MEDICINE

## 2021-02-17 PROCEDURE — 96372 THER/PROPH/DIAG INJ SC/IM: CPT

## 2021-02-17 PROCEDURE — 99283 EMERGENCY DEPT VISIT LOW MDM: CPT

## 2021-02-17 PROCEDURE — 93005 ELECTROCARDIOGRAM TRACING: CPT | Performed by: EMERGENCY MEDICINE

## 2021-02-17 RX ORDER — KETOROLAC TROMETHAMINE 30 MG/ML
15 INJECTION, SOLUTION INTRAMUSCULAR; INTRAVENOUS ONCE
Status: COMPLETED | OUTPATIENT
Start: 2021-02-17 | End: 2021-02-17

## 2021-02-17 RX ORDER — CAPSAICIN 0.75 MG/G
1 CREAM TOPICAL EVERY 8 HOURS PRN
Qty: 57 G | Refills: 0 | Status: SHIPPED | OUTPATIENT
Start: 2021-02-17

## 2021-02-17 RX ORDER — IBUPROFEN 600 MG/1
600 TABLET ORAL EVERY 6 HOURS PRN
Qty: 30 TABLET | Refills: 0 | Status: SHIPPED | OUTPATIENT
Start: 2021-02-17 | End: 2023-12-23

## 2021-02-17 RX ADMIN — KETOROLAC TROMETHAMINE 15 MG: 30 INJECTION, SOLUTION INTRAMUSCULAR; INTRAVENOUS at 07:53

## 2021-02-17 ASSESSMENT — LIFESTYLE VARIABLES: DO YOU DRINK ALCOHOL: NO

## 2021-02-17 NOTE — ED TRIAGE NOTES
"Chief Complaint   Patient presents with   • T-5000 GLF     PT reports a slip and fall last Friday and today left side pain and not able to work.     Blood Pressure 142/93   Pulse (Abnormal) 58   Temperature 35.9 °C (96.7 °F) (Temporal)   Respiration 16   Height 1.676 m (5' 6\")   Oxygen Saturation 98%   Body Mass Index 28.73 kg/m²     "

## 2021-02-17 NOTE — LETTER
"  FORM C-4:  EMPLOYEE’S CLAIM FOR COMPENSATION/ REPORT OF INITIAL TREATMENT  EMPLOYEE’S CLAIM - PROVIDE ALL INFORMATION REQUESTED   First Name Jeff Last Name Chris Birthdate 1965  Sex male Claim Number   Home Address [address]    [facility] [place]             Zip [address]                                   Age  56 y.o. Height  1.676 m (5' 6\") Weight  73.3 kg (161 lb 9.6 oz) [facility]     Mailing Address [address]   [facility]              Zip [address] Telephone  [phone] (home)  Primary Language Spoken  English   Insurer   Third Party    Employee's Occupation (Job Title) When Injury or Occupational Disease Occurred  [employer]   Employer's Name [employer] Telephone [phone]    Employer Address [address]] Zip [address]   Date of Injury  2/12/2021       Hour of Injury  8:30 AM Date Employer Notified  2/17/2021 Last Day of Work after Injury or Occupational Disease  2/16/2021 Supervisor to Whom Injury Reported  Adolfo   Address or Location of Accident (if applicable) [[place]]   What were you doing at the time of accident? (if applicable) Walk whit my tool    How did this injury or occupational disease occur? Be specific and answer in detail. Use additional sheet if necessary)  I was walking and slipped   If you believe that you have an occupational disease, when did you first have knowledge of the disability and it relationship to your employment?  Witnesses to the Accident  NA   Nature of Injury or Occupational Disease  Strain Part(s) of Body Injured or Affected  Chest, ,     I CERTIFY THAT THE ABOVE IS TRUE AND CORRECT TO THE BEST OF MY KNOWLEDGE AND THAT I HAVE PROVIDED THIS INFORMATION IN ORDER TO OBTAIN THE BENEFITS OF [place]).  I HEREBY AUTHORIZE ANY PHYSICIAN, CHIROPRACTOR, " SURGEON, PRACTITIONER, OR OTHER PERSON, ANY HOSPITAL, INCLUDING [facility] OR [facility], ANY MEDICAL SERVICE ORGANIZATION, ANY INSURANCE COMPANY, OR OTHER INSTITUTION OR ORGANIZATION TO RELEASE TO EACH OTHER, ANY MEDICAL OR OTHER INFORMATION, INCLUDING BENEFITS PAID OR PAYABLE, PERTINENT TO THIS INJURY OR DISEASE, EXCEPT INFORMATION RELATIVE TO DIAGNOSIS, TREATMENT AND/OR COUNSELING FOR AIDS, PSYCHOLOGICAL CONDITIONS, ALCOHOL OR CONTROLLED SUBSTANCES, FOR WHICH I MUST GIVE SPECIFIC AUTHORIZATION.  A PHOTOSTAT OF THIS AUTHORIZATION SHALL BE AS VALID AS THE ORIGINAL.  Date                                      Place      [facility]                                                                       Employee’s Signature   THIS REPORT MUST BE COMPLETED AND MAILED WITHIN 3 WORKING DAYS OF TREATMENT   Place [facility], EMERGENCY DEPT                       Name of Facility [facility]   Date  2/17/2021 Diagnosis  (S29.011A) Pectoralis muscle strain, initial encounter  (M67.911) Rotator cuff disorder, right Is there evidence the injured employee was under the influence of alcohol and/or another controlled substance at the time of accident?   Hour  8:44 AM Description of Injury or Disease  Pectoralis muscle strain, initial encounter  Rotator cuff disorder, right No   Treatment  Ibuprofen  Have you advised the patient to remain off work five days or more?         Yes   X-Ray Findings    If Yes   From Date  2/17/2021 To Date  2/24/2021   From information given by the employee, together with medical evidence, can you directly connect this injury or occupational disease as job incurred? Yes If No, is employee capable of: Full Duty  No Modified Duty  Yes   Is additional medical care by a physician indicated?   Comments:Occupational health If Modified Duty, Specify any Limitations / Restrictions   Minimal lifting, until symptoms are improved   Do you know of any previous  "injury or disease contributing to this condition or occupational disease? No    Date 2/17/2021 Print Doctor’s Name Sirena Cortes certify the employer’s copy of this form was mailed on:   Address [address] [place] [address]  [place] [address] [phone] INSURER’S USE ONLY   Provider’s Tax ID Number [id number] Telephone Dept: [phone]    Doctor’s Signature e-SIRENA Monteiro M.D. Degree  MD      Form C-4 (rev.10/07)                                                                         BRIEF DESCRIPTION OF RIGHTS AND BENEFITS  (Pursuant to [place] 616C.050)    Notice of Injury or Occupational Disease (Incident Report Form C-1): If an injury or occupational disease (OD) arises out of and in the course of employment, you must provide written notice to your employer as soon as practicable, but no later than 7 days after the accident or OD. Your employer shall maintain a sufficient supply of the required forms.    Claim for Compensation (Form C-4): If medical treatment is sought, the form C-4 is available at the place of initial treatment. A completed \"Claim for Compensation\" (Form C-4) must be filed within 90 days after an accident or OD. The treating physician or chiropractor must, within 3 working days after treatment, complete and mail to the employer, the employer's insurer and third-party , the Claim for Compensation.    Medical Treatment: If you require medical treatment for your on-the-job injury or OD, you may be required to select a physician or chiropractor from a list provided by your workers’ compensation insurer, if it has contracted with an Organization for Managed Care (MCO) or Preferred Provider Organization (PPO) or providers of health care. If your employer has not entered into a contract with an MCO or PPO, you may select a physician or chiropractor from the Panel of Physicians and Chiropractors. Any medical costs related to your industrial injury or OD will be paid by your " insurer.    Temporary Total Disability (TTD): If your doctor has certified that you are unable to work for a period of at least 5 consecutive days, or 5 cumulative days in a 20-day period, or places restrictions on you that your employer does not accommodate, you may be entitled to TTD compensation.    Temporary Partial Disability (TPD): If the wage you receive upon reemployment is less than the compensation for TTD to which you are entitled, the insurer may be required to pay you TPD compensation to make up the difference. TPD can only be paid for a maximum of 24 months.    Permanent Partial Disability (PPD): When your medical condition is stable and there is an indication of a PPD as a result of your injury or OD, within 30 days, your insurer must arrange for an evaluation by a rating physician or chiropractor to determine the degree of your PPD. The amount of your PPD award depends on the date of injury, the results of the PPD evaluation, your age and wage.    Permanent Total Disability (PTD): If you are medically certified by a treating physician or chiropractor as permanently and totally disabled and have been granted a PTD status by your insurer, you are entitled to receive monthly benefits not to exceed 66 2/3% of your average monthly wage. The amount of your PTD payments is subject to reduction if you previously received a lump-sum PPD award.    Vocational Rehabilitation Services: You may be eligible for vocational rehabilitation services if you are unable to return to the job due to a permanent physical impairment or permanent restrictions as a result of your injury or occupational disease.    Transportation and Per Terence Reimbursement: You may be eligible for travel expenses and per terence associated with medical treatment.    Reopening: You may be able to reopen your claim if your condition worsens after claim closure.     Appeal Process: If you disagree with a written determination issued by the insurer or  the insurer does not respond to your request, you may appeal to the Department of Administration, , by following the instructions contained in your determination letter. You must appeal the determination within 70 days from the date of the determination letter at [address], [address], [place], [place] [address], or [address], [address], [place], [place] [address]. If you disagree with the  decision, you may appeal to the Department of Administration, . You must file your appeal within 30 days from the date of the  decision letter at [address], [address], [place], [place] [address], or [address], [address], [place], [place] [address]. If you disagree with a decision of an , you may file a petition for judicial review with the District Court. You must do so within 30 days of the Appeal Officer’s decision. You may be represented by an  at your own expense or you may contact the [facility] for possible representation.    [place]  for Injured Workers (NAIW): If you disagree with a  decision, you may request that NAIW represent you without charge at an  Hearing. For information regarding denial of benefits, you may contact the [facility] at: [address], [address], [place] [address], [phone], or [address], [place] [address], [phone]    To File a Complaint with the Division: If you wish to file a complaint with the  of the Division of Industrial Relations (DIR),  please contact the Workers’ Compensation Section, [address], [address], [place], [place] [address], telephone [phone], or [address], [place], [place] [address], telephone [phone].    For assistance with Workers’ Compensation Issues: You may contact the [facility] Office for Consumer Health Assistance,  [address] [facility], [address], [place] [address], Toll Free 1-785.338.9195, Web site: http://[facility].[place].gov/Programs/TRISHA E-mail: trisha@[facility].[place].gov  D-2 (rev. 10/20)              __________________________________________________________________                                    _________________            Employee Name / Signature                                                                                                                            Date

## 2021-02-17 NOTE — DISCHARGE INSTRUCTIONS
If you develop worsening pain, pain when you walk, shortness of breath, or nausea with the pain or sweating with the pain please return immediately.

## 2021-02-17 NOTE — ED PROVIDER NOTES
ED Provider Note    CHIEF COMPLAINT  Chief Complaint   Patient presents with   • T-5000 GLF     PT reports a slip and fall last Friday and today left side pain and not able to work.       HPI  Jeff Perez is a 56 y.o. male with hx of DM who presents 4 days status post a slip and fall here with ongoing left-sided pain.  Patient reports that he was walking, slipped and fell onto his right chest.  Patient reports that since that time whenever he moves his right shoulder or tries to lift something with his right arm he developed some pain at his shoulder and pectoralis muscle.  Patient denies any pain evoked when he uses his left arm.  Patient denies any pain on exertion, he reports he can walk as far as he like without any issue.  Patient denies any associated nausea or diaphoresis.  Patient denies any decreased exertional tolerance.  Patient denies any associated abdominal pain.  Patient denies any neck or back pain, weakness or numbness.    REVIEW OF SYSTEMS  ROS  See HPI for further details. All other systems are negative.     PAST MEDICAL HISTORY   has a past medical history of Diabetes (CMS-HCC) (HCC).    SOCIAL HISTORY  Social History     Tobacco Use   • Smoking status: Not on file   Substance and Sexual Activity   • Alcohol use: No   • Drug use: No   • Sexual activity: Not on file       SURGICAL HISTORY   has a past surgical history that includes irrigation & debridement general (Left, 7/6/2017).    CURRENT MEDICATIONS  Home Medications    **Home medications have not yet been reviewed for this encounter**         ALLERGIES  No Known Allergies    PHYSICAL EXAM  Vitals:    02/17/21 0655   BP: 142/93   Pulse: (!) 58   Resp: 16   Temp: 35.9 °C (96.7 °F)   SpO2: 98%       Physical Exam   Constitutional: He is oriented to person, place, and time. He appears well-developed and well-nourished.   HENT:   Head: Normocephalic and atraumatic.   No scalp hematoma   Eyes: Pupils are equal, round, and reactive to  light. Conjunctivae are normal.   Cardiovascular: Normal rate and regular rhythm. Exam reveals no gallop and no friction rub.   No murmur heard.  Pulmonary/Chest: Effort normal and breath sounds normal. No respiratory distress. He has no wheezes.   Abdominal: Soft. Bowel sounds are normal. He exhibits no distension. There is no abdominal tenderness. There is no rebound.   Musculoskeletal:      Cervical back: Normal range of motion and neck supple.      Comments: Tenderness palpation along the long head of the pectoralis major, pain is provoked on pushing me away activating his right pectoralis.  He also has a very mild pain on Hawking's exam.  There is no associated rib tenderness.  Bilateral upper extremity strength is 5 out of 5, sensation intact in ulnar radial median distribution.  No tenderness to palpation of cervical, thoracic and lumbar spine.   Neurological: He is alert and oriented to person, place, and time.   Skin: Skin is warm and dry.   Psychiatric: He has a normal mood and affect. His behavior is normal.     Results for orders placed or performed during the hospital encounter of 21   EKG   Result Value Ref Range    Report       [facility] Emergency Dept.    Test Date:  2021  Pt Name:    TOYIN CHEN     Department: ER  MRN:        [id number]                      Room:       [facility]  Gender:     Male                         Technician: [id number]  :        1965                   Requested By:SIRENA DU  Order #:    [id number]                    Reading MD: Sebastian Maguire MD    Measurements  Intervals                                Axis  Rate:       56                           P:          42  CT:         152                          QRS:        49  QRSD:       104                          T:          39  QT:         424  QTc:        410    Interpretive Statements  EKG is normal sinus rhythm, normal axis normal intervals no ST changes  consistent with acute  regional ischemia.  Electronically Signed On 2- 7:48:49 PST by Sebastian Maguire MD           COURSE & MEDICAL DECISION MAKING  Pertinent Labs & Imaging studies reviewed. (See chart for details)    Patient here with what appears to be a pectoralis injury and a mild rotator cuff injury.  Patient symptoms are highly atypical of ACS.  Given that the symptoms began immediately after patient fell and is entirely reproducible on exam and patient without any associated exertional symptoms or associated symptoms to suggest ACS I have a very low pretest probability of ACS.  Fall is described as mechanical, he slipped and fell onto his side, he did not have any syncopal event or near syncopal event.  Will check EKG.  Patient cervical, thoracic and lumbar spine cleared clinically.  I have very low suspicion of liver laceration or intrathoracic or intra-abdominal trauma given patient's very benign exam.  Patient given a work note; ibuprofen and capsaicin for symptomatic management.  EKG is reassuring.  Patient has ambulated in the emergency department without any provocation of his symptoms.  Return precautions discussed.     The patient will return for worsening symptoms and is stable at the time of discharge. The patient verbalizes understanding and will comply.    FINAL IMPRESSION    1. Pectoralis muscle strain, initial encounter    2. Rotator cuff disorder, right               Electronically signed by: Andrez Cortes M.D., 2/17/2021 7:26 AM

## 2023-04-15 ENCOUNTER — HOSPITAL ENCOUNTER (OUTPATIENT)
Dept: LAB | Facility: MEDICAL CENTER | Age: 58
End: 2023-04-15
Attending: FAMILY MEDICINE
Payer: COMMERCIAL

## 2023-04-15 LAB
ALBUMIN SERPL BCP-MCNC: 4 G/DL (ref 3.2–4.9)
ALBUMIN/GLOB SERPL: 1.2 G/DL
ALP SERPL-CCNC: 73 U/L (ref 30–99)
ALT SERPL-CCNC: 30 U/L (ref 2–50)
ANION GAP SERPL CALC-SCNC: 10 MMOL/L (ref 7–16)
AST SERPL-CCNC: 22 U/L (ref 12–45)
BASOPHILS # BLD AUTO: 0.8 % (ref 0–1.8)
BASOPHILS # BLD: 0.07 K/UL (ref 0–0.12)
BILIRUB SERPL-MCNC: 0.3 MG/DL (ref 0.1–1.5)
BUN SERPL-MCNC: 14 MG/DL (ref 8–22)
CALCIUM ALBUM COR SERPL-MCNC: 8.4 MG/DL (ref 8.5–10.5)
CALCIUM SERPL-MCNC: 8.4 MG/DL (ref 8.5–10.5)
CHLORIDE SERPL-SCNC: 107 MMOL/L (ref 96–112)
CHOLEST SERPL-MCNC: 71 MG/DL (ref 100–199)
CO2 SERPL-SCNC: 23 MMOL/L (ref 20–33)
CREAT SERPL-MCNC: 0.7 MG/DL (ref 0.5–1.4)
CREAT UR-MCNC: 99.55 MG/DL
EOSINOPHIL # BLD AUTO: 0.24 K/UL (ref 0–0.51)
EOSINOPHIL NFR BLD: 2.7 % (ref 0–6.9)
ERYTHROCYTE [DISTWIDTH] IN BLOOD BY AUTOMATED COUNT: 41.3 FL (ref 35.9–50)
EST. AVERAGE GLUCOSE BLD GHB EST-MCNC: 200 MG/DL
GFR SERPLBLD CREATININE-BSD FMLA CKD-EPI: 107 ML/MIN/1.73 M 2
GLOBULIN SER CALC-MCNC: 3.3 G/DL (ref 1.9–3.5)
GLUCOSE SERPL-MCNC: 201 MG/DL (ref 65–99)
HBA1C MFR BLD: 8.6 % (ref 4–5.6)
HCT VFR BLD AUTO: 42.1 % (ref 42–52)
HDLC SERPL-MCNC: 24 MG/DL
HGB BLD-MCNC: 13.9 G/DL (ref 14–18)
IMM GRANULOCYTES # BLD AUTO: 0.03 K/UL (ref 0–0.11)
IMM GRANULOCYTES NFR BLD AUTO: 0.3 % (ref 0–0.9)
LDLC SERPL CALC-MCNC: 36 MG/DL
LYMPHOCYTES # BLD AUTO: 4.02 K/UL (ref 1–4.8)
LYMPHOCYTES NFR BLD: 44.4 % (ref 22–41)
MCH RBC QN AUTO: 30.8 PG (ref 27–33)
MCHC RBC AUTO-ENTMCNC: 33 G/DL (ref 33.7–35.3)
MCV RBC AUTO: 93.3 FL (ref 81.4–97.8)
MICROALBUMIN UR-MCNC: <1.2 MG/DL
MICROALBUMIN/CREAT UR: NORMAL MG/G (ref 0–30)
MONOCYTES # BLD AUTO: 0.72 K/UL (ref 0–0.85)
MONOCYTES NFR BLD AUTO: 8 % (ref 0–13.4)
NEUTROPHILS # BLD AUTO: 3.97 K/UL (ref 1.82–7.42)
NEUTROPHILS NFR BLD: 43.8 % (ref 44–72)
NRBC # BLD AUTO: 0 K/UL
NRBC BLD-RTO: 0 /100 WBC
PLATELET # BLD AUTO: 275 K/UL (ref 164–446)
PMV BLD AUTO: 9.8 FL (ref 9–12.9)
POTASSIUM SERPL-SCNC: 4.6 MMOL/L (ref 3.6–5.5)
PROT SERPL-MCNC: 7.3 G/DL (ref 6–8.2)
RBC # BLD AUTO: 4.51 M/UL (ref 4.7–6.1)
SODIUM SERPL-SCNC: 140 MMOL/L (ref 135–145)
TRIGL SERPL-MCNC: 56 MG/DL (ref 0–149)
WBC # BLD AUTO: 9.1 K/UL (ref 4.8–10.8)

## 2023-04-15 PROCEDURE — 82570 ASSAY OF URINE CREATININE: CPT

## 2023-04-15 PROCEDURE — 83036 HEMOGLOBIN GLYCOSYLATED A1C: CPT

## 2023-04-15 PROCEDURE — 80053 COMPREHEN METABOLIC PANEL: CPT

## 2023-04-15 PROCEDURE — 80061 LIPID PANEL: CPT

## 2023-04-15 PROCEDURE — 85025 COMPLETE CBC W/AUTO DIFF WBC: CPT

## 2023-04-15 PROCEDURE — 82043 UR ALBUMIN QUANTITATIVE: CPT

## 2023-04-15 PROCEDURE — 36415 COLL VENOUS BLD VENIPUNCTURE: CPT

## 2023-08-24 ENCOUNTER — HOSPITAL ENCOUNTER (OUTPATIENT)
Dept: RADIOLOGY | Facility: MEDICAL CENTER | Age: 58
End: 2023-08-24
Attending: FAMILY MEDICINE
Payer: COMMERCIAL

## 2023-08-24 DIAGNOSIS — M21.162 GENU VARUM OF LEFT LOWER EXTREMITY: ICD-10-CM

## 2023-08-24 PROCEDURE — 73560 X-RAY EXAM OF KNEE 1 OR 2: CPT | Mod: LT

## 2023-12-23 ENCOUNTER — OFFICE VISIT (OUTPATIENT)
Dept: URGENT CARE | Facility: PHYSICIAN GROUP | Age: 58
End: 2023-12-23
Payer: COMMERCIAL

## 2023-12-23 ENCOUNTER — TELEPHONE (OUTPATIENT)
Dept: URGENT CARE | Facility: PHYSICIAN GROUP | Age: 58
End: 2023-12-23

## 2023-12-23 VITALS
TEMPERATURE: 99.6 F | SYSTOLIC BLOOD PRESSURE: 116 MMHG | OXYGEN SATURATION: 94 % | RESPIRATION RATE: 16 BRPM | HEIGHT: 65 IN | BODY MASS INDEX: 25.29 KG/M2 | WEIGHT: 151.8 LBS | HEART RATE: 110 BPM | DIASTOLIC BLOOD PRESSURE: 74 MMHG

## 2023-12-23 DIAGNOSIS — J11.1 FLU: ICD-10-CM

## 2023-12-23 DIAGNOSIS — Z20.818 STREP THROAT EXPOSURE: Primary | ICD-10-CM

## 2023-12-23 DIAGNOSIS — R50.9 FEVER, UNSPECIFIED FEVER CAUSE: ICD-10-CM

## 2023-12-23 DIAGNOSIS — R05.1 ACUTE COUGH: ICD-10-CM

## 2023-12-23 DIAGNOSIS — Z86.39 HX OF DIABETES MELLITUS: ICD-10-CM

## 2023-12-23 DIAGNOSIS — J02.9 SORE THROAT: ICD-10-CM

## 2023-12-23 PROBLEM — E11.9 TYPE 2 DIABETES MELLITUS (HCC): Status: ACTIVE | Noted: 2018-12-03

## 2023-12-23 LAB
FLUAV RNA SPEC QL NAA+PROBE: POSITIVE
FLUBV RNA SPEC QL NAA+PROBE: NEGATIVE
RSV RNA SPEC QL NAA+PROBE: NEGATIVE
SARS-COV-2 RNA RESP QL NAA+PROBE: NEGATIVE

## 2023-12-23 PROCEDURE — 99203 OFFICE O/P NEW LOW 30 MIN: CPT | Performed by: FAMILY MEDICINE

## 2023-12-23 PROCEDURE — 3078F DIAST BP <80 MM HG: CPT | Performed by: FAMILY MEDICINE

## 2023-12-23 PROCEDURE — 3074F SYST BP LT 130 MM HG: CPT | Performed by: FAMILY MEDICINE

## 2023-12-23 PROCEDURE — 0241U POCT CEPHEID COV-2, FLU A/B, RSV - PCR: CPT | Performed by: FAMILY MEDICINE

## 2023-12-23 RX ORDER — BENZONATATE 200 MG/1
200 CAPSULE ORAL 3 TIMES DAILY PRN
Qty: 30 CAPSULE | Refills: 0 | Status: SHIPPED | OUTPATIENT
Start: 2023-12-23

## 2023-12-23 RX ORDER — CEFDINIR 300 MG/1
300 CAPSULE ORAL 2 TIMES DAILY
Qty: 10 CAPSULE | Refills: 0 | Status: SHIPPED | OUTPATIENT
Start: 2023-12-23 | End: 2023-12-28

## 2023-12-23 RX ORDER — OSELTAMIVIR PHOSPHATE 75 MG/1
75 CAPSULE ORAL EVERY 12 HOURS
Qty: 10 CAPSULE | Refills: 0 | Status: SHIPPED | OUTPATIENT
Start: 2023-12-23 | End: 2023-12-28

## 2023-12-23 ASSESSMENT — ENCOUNTER SYMPTOMS
MYALGIAS: 1
COUGH: 1
CHILLS: 1
SORE THROAT: 1
FEVER: 1

## 2023-12-23 ASSESSMENT — FIBROSIS 4 INDEX: FIB4 SCORE: 0.83

## 2023-12-23 NOTE — TELEPHONE ENCOUNTER
Kindly call (DOCUMENT CALL OR ATTEMPTED CALL IN EPIC) and let patient know:        Results from nose swab shows you have the FLU. Medication for flu sent to pharmacy

## 2023-12-23 NOTE — PROGRESS NOTES
Subjective     Jeff Perez is a 58 y.o. male who presents with Cough (Cough, fever, congestion, sore throat, chills X 2 days )      - This is a very pleasant 58 y.o. who has come to the walk-in clinic today for 2 days w/ sore throat, subjective fever and some cough w/o bloody sputum, some body aches and tired. Wife is currently being treat for strep throat.       Hx DM, on meds, HgA1C 8.6 4/23.   4/23      ALLERGIES:  Patient has no known allergies.     PMH:  Past Medical History:   Diagnosis Date    Diabetes (HCC)         PSH:  Past Surgical History:   Procedure Laterality Date    IRRIGATION & DEBRIDEMENT GENERAL Left 7/6/2017    Procedure: IRRIGATION & DEBRIDEMENT, LACERATION REPAIR LEFT FOREARM;  Surgeon: Abdullahi Sanchez M.D.;  Location: SURGERY [facility];  Service:        MEDS:    Current Outpatient Medications:     cefdinir (OMNICEF) 300 MG Cap, Take 1 Capsule by mouth 2 times a day for 5 days., Disp: 10 Capsule, Rfl: 0    benzonatate (TESSALON) 200 MG capsule, Take 1 Capsule by mouth 3 times a day as needed for Cough., Disp: 30 Capsule, Rfl: 0    oseltamivir (TAMIFLU) 75 MG Cap, Take 1 Capsule by mouth every 12 hours for 5 days., Disp: 10 Capsule, Rfl: 0    capsicum (ZOSTRIX) 0.075 % topical cream, Apply 1 Application topically every 8 hours as needed (for muscle pain)., Disp: 57 g, Rfl: 0    oxycodone immediate-release (ROXICODONE) 5 MG Tab, Take 1 Tab by mouth every 6 hours as needed (Moderate Pain (NRS Pain Scale 4-6; CPOT Pain Scale 3-5))., Disp: 15 Tab, Rfl: 0    metformin (GLUCOPHAGE) 500 MG Tab, Take 500 mg by mouth every day., Disp: , Rfl:     ** I have documented what I find to be significant in regards to past medical, social, family and surgical history  in my HPI or under PMH/PSH/FH review section, otherwise it is noncontributory **         HPI    Review of Systems   Constitutional:  Positive for chills and fever.   HENT:  Positive for sore throat.    Respiratory:   "Positive for cough.    Musculoskeletal:  Positive for myalgias.   All other systems reviewed and are negative.             Objective     /74 (BP Location: Right arm, Patient Position: Sitting, BP Cuff Size: Adult)   Pulse (!) 110   Temp 37.6 °C (99.6 °F) (Temporal)   Resp 16   Ht 1.656 m (5' 5.2\")   Wt 68.9 kg (151 lb 12.8 oz)   SpO2 94%   BMI 25.11 kg/m²      Physical Exam  Vitals and nursing note reviewed.   Constitutional:       General: He is not in acute distress.     Appearance: Normal appearance. He is well-developed.   HENT:      Head: Normocephalic.      Mouth/Throat:      Mouth: Mucous membranes are moist.      Pharynx: Oropharynx is clear. Posterior oropharyngeal erythema present. No oropharyngeal exudate.   Cardiovascular:      Heart sounds: Normal heart sounds. No murmur heard.  Pulmonary:      Effort: Pulmonary effort is normal. No respiratory distress.      Breath sounds: No wheezing, rhonchi or rales.   Musculoskeletal:      Cervical back: Tenderness present. No rigidity.   Neurological:      Mental Status: He is alert.      Motor: No abnormal muscle tone.   Psychiatric:         Mood and Affect: Mood normal.         Behavior: Behavior normal.                             Assessment & Plan     1. Strep throat exposure  cefdinir (OMNICEF) 300 MG Cap      2. Fever  POCT CoV-2, Flu A/B, RSV by PCR      3. Sore throat  POCT CoV-2, Flu A/B, RSV by PCR    cefdinir (OMNICEF) 300 MG Cap      4. Acute cough  benzonatate (TESSALON) 200 MG capsule      5. Hx of diabetes mellitus        6. Flu  oseltamivir (TAMIFLU) 75 MG Cap          Will treat for presumptive strep throat due to close exposure and symptomatic. Will also r/o flu/covid as having some cough    - Dx, plan & d/c instructions discussed   - Rest, stay hydrated  - OTC Motrin and/or Tylenol as needed      Follow up with your regular primary care providers office within a week to keep them updated and informed of this visit and for regular " routine health maintenance check-ups. ER if not improving in 2-3 days or if feeling/getting worse.    Any realistic side effects of medications that may have been given today reviewed.     Patient left in stable condition     POCT results reviewed/discussed      Pertinent prior lab work and/or imaging studies in Epic have been reviewed by me today on day of this visit and taken into account for my treatment and plan today    Pertinent PMH/PSH and/or chronic conditions and medications if any were reviewed today and taken into account for my treatment and plan today    Pertinent prior office visit notes in [facility] have been reviewed by me today on day of this visit.    Please note that this dictation may have been created using voice recognition software, if so I have made every reasonable attempt to correct obvious errors, but I expect that there are errors of grammar and possibly content that I did not discover before finalizing the note.

## (undated) DEVICE — RESERVOIR SUCTION 100 CC - SILICONE (20EA/CA)

## (undated) DEVICE — PACK MINOR BASIN - (2EA/CA)

## (undated) DEVICE — BLANKET WARMING LOWER BODY - (10/CA) INACTIVE USE #8585

## (undated) DEVICE — TUBING CLEARLINK DUO-VENT - C-FLO (48EA/CA)

## (undated) DEVICE — TUBE E-T HI-LO CUFF 8.0MM (10EA/PK)

## (undated) DEVICE — SODIUM CHL IRRIGATION 0.9% 1000ML (12EA/CA)

## (undated) DEVICE — SUTURE 2-0 ETHILON FS - (36/BX) 18 INCH

## (undated) DEVICE — SET EXTENSION WITH 2 PORTS (48EA/CA) ***PART #2C8610 IS A SUBSTITUTE*****

## (undated) DEVICE — NEPTUNE 4 PORT MANIFOLD - (20/PK)

## (undated) DEVICE — SENSOR SPO2 NEO LNCS ADHESIVE (20/BX) SEE USER NOTES

## (undated) DEVICE — TRAY SKIN SCRUB PVP WET (20EA/CA) PART #DYND70356 DISCONTINUED

## (undated) DEVICE — KIT ANESTHESIA W/CIRCUIT & 3/LT BAG W/FILTER (20EA/CA)

## (undated) DEVICE — SUTURE 2-0 SILK 12 X 18" (36PK/BX)"

## (undated) DEVICE — DRAPE LARGE 3 QUARTER - (20/CA)

## (undated) DEVICE — GOWN SURGEONS X-LARGE - DISP. (30/CA)

## (undated) DEVICE — BANDAGE ROLL STERILE BULKEE 4.5 IN X 4 YD (100EA/CA)

## (undated) DEVICE — PROTECTOR ULNA NERVE - (36PR/CA)

## (undated) DEVICE — SUTURE GENERAL

## (undated) DEVICE — SUTURE 3-0 VICRYL PLUS SH - 8X 18 INCH (12/BX)

## (undated) DEVICE — ELECTRODE 850 FOAM ADHESIVE - HYDROGEL RADIOTRNSPRNT (50/PK)

## (undated) DEVICE — LEAD SET 6 DISP. EKG NIHON KOHDEN (100EA/CA) [9859].

## (undated) DEVICE — GLOVE BIOGEL INDICATOR SZ 7.5 SURGICAL PF LTX - (50PR/BX 4BX/CA)

## (undated) DEVICE — CLIP MED INTNL HRZN TI ESCP - (25/BX)

## (undated) DEVICE — MASK ANESTHESIA ADULT  - (100/CA)

## (undated) DEVICE — TAPE CLOTH MEDIPORE 6 INCH - (12RL/CA)

## (undated) DEVICE — BLADE SURGICAL #15 - (50/BX 3BX/CA)

## (undated) DEVICE — KIT ROOM DECONTAMINATION

## (undated) DEVICE — GLOVE BIOGEL SZ 6 PF LATEX - (50EA/BX 4BX/CA)

## (undated) DEVICE — PAD LAP STERILE 18 X 18 - (5/PK 40PK/CA)

## (undated) DEVICE — GLOVE, BIOGEL ECLIPSE, SZ 7.0, PF LTX (50/BX)

## (undated) DEVICE — LACTATED RINGERS INJ 1000 ML - (14EA/CA 60CA/PF)

## (undated) DEVICE — TOWELS CLOTH SURGICAL - (4/PK 20PK/CA)

## (undated) DEVICE — SUCTION INSTRUMENT YANKAUER BULBOUS TIP W/O VENT (50EA/CA)

## (undated) DEVICE — SUTURE 3-0 ETHILON FS-1 - (36/BX) 30 INCH

## (undated) DEVICE — ELECTRODE DUAL RETURN W/ CORD - (50/PK)

## (undated) DEVICE — CANISTER SUCTION 3000ML MECHANICAL FILTER AUTO SHUTOFF MEDI-VAC NONSTERILE LF DISP  (40EA/CA)

## (undated) DEVICE — SET LEADWIRE 5 LEAD BEDSIDE DISPOSABLE ECG (1SET OF 5/EA)

## (undated) DEVICE — SUTURE 3-0 SILK 12 X 18 IN - (36/BX)

## (undated) DEVICE — SLEEVE, VASO, THIGH, MED

## (undated) DEVICE — SPONGE GAUZESTER 4 X 4 4PLY - (128PK/CA)

## (undated) DEVICE — HEAD HOLDER JUNIOR/ADULT